# Patient Record
Sex: MALE | Race: WHITE | URBAN - METROPOLITAN AREA
[De-identification: names, ages, dates, MRNs, and addresses within clinical notes are randomized per-mention and may not be internally consistent; named-entity substitution may affect disease eponyms.]

---

## 2020-01-15 ENCOUNTER — OFFICE VISIT (OUTPATIENT)
Dept: NEUROLOGY | Facility: CLINIC | Age: 70
End: 2020-01-15
Payer: MEDICARE

## 2020-01-15 ENCOUNTER — MED REC SCAN ONLY (OUTPATIENT)
Dept: NEUROLOGY | Facility: CLINIC | Age: 70
End: 2020-01-15

## 2020-01-15 VITALS
HEART RATE: 80 BPM | BODY MASS INDEX: 29.12 KG/M2 | RESPIRATION RATE: 16 BRPM | WEIGHT: 215 LBS | SYSTOLIC BLOOD PRESSURE: 160 MMHG | HEIGHT: 72 IN | DIASTOLIC BLOOD PRESSURE: 102 MMHG

## 2020-01-15 DIAGNOSIS — G57.92 NEURITIS OF LEFT FOOT: Primary | ICD-10-CM

## 2020-01-15 PROCEDURE — 99204 OFFICE O/P NEW MOD 45 MIN: CPT | Performed by: PHYSICAL MEDICINE & REHABILITATION

## 2020-01-15 RX ORDER — HYDROCODONE BITARTRATE AND ACETAMINOPHEN 5; 325 MG/1; MG/1
1 TABLET ORAL EVERY 8 HOURS PRN
Qty: 90 TABLET | Refills: 0 | Status: SHIPPED | OUTPATIENT
Start: 2020-01-15 | End: 2020-02-20

## 2020-01-15 RX ORDER — HYDROCODONE BITARTRATE AND ACETAMINOPHEN 5; 325 MG/1; MG/1
1 TABLET ORAL EVERY 6 HOURS PRN
COMMUNITY
End: 2020-01-15

## 2020-01-15 RX ORDER — NORTRIPTYLINE HYDROCHLORIDE 10 MG/1
CAPSULE ORAL
Qty: 60 CAPSULE | Refills: 0 | Status: SHIPPED | OUTPATIENT
Start: 2020-01-15 | End: 2020-02-20

## 2020-01-15 RX ORDER — LISINOPRIL 30 MG/1
30 TABLET ORAL DAILY
COMMUNITY

## 2020-01-15 NOTE — PROGRESS NOTES
[address]  Progress Note    CHIEF COMPLAINT:  Patient presents with: Foot Pain: Patient presents for left foot pain for past 8-9 years, has been constant for past 4 years.  Wearing boots worsen pain, rated family history on file. CURRENT MEDICATIONS:   Current Outpatient Medications   Medication Sig Dispense Refill   • lisinopril 30 MG Oral Tab Take 30 mg by mouth daily.      • Nortriptyline HCl 10 MG Oral Cap 1-2 tablets orally every night 60 capsule 0 peripheral pulses intact. Normal capillary refill. Lungs: Non-labored respirations  Extremities: No lower extremity edema bilaterally   Skin: No lesions noted.    Spine:  full and painfree lumbar ROM in all directions  Feet: He is slightly puffy just infe

## 2020-02-20 ENCOUNTER — OFFICE VISIT (OUTPATIENT)
Dept: NEUROLOGY | Facility: CLINIC | Age: 70
End: 2020-02-20
Payer: MEDICARE

## 2020-02-20 VITALS
HEIGHT: 72 IN | BODY MASS INDEX: 29.12 KG/M2 | DIASTOLIC BLOOD PRESSURE: 90 MMHG | HEART RATE: 80 BPM | WEIGHT: 215 LBS | SYSTOLIC BLOOD PRESSURE: 136 MMHG

## 2020-02-20 DIAGNOSIS — G57.92 NEURITIS OF LEFT FOOT: Primary | ICD-10-CM

## 2020-02-20 PROCEDURE — 99213 OFFICE O/P EST LOW 20 MIN: CPT | Performed by: PHYSICAL MEDICINE & REHABILITATION

## 2020-02-20 RX ORDER — HYDROCODONE BITARTRATE AND ACETAMINOPHEN 5; 325 MG/1; MG/1
1 TABLET ORAL EVERY 8 HOURS PRN
Qty: 90 TABLET | Refills: 0 | Status: SHIPPED | OUTPATIENT
Start: 2020-02-20 | End: 2020-03-23

## 2020-02-20 RX ORDER — NORTRIPTYLINE HYDROCHLORIDE 25 MG/1
CAPSULE ORAL
Qty: 60 CAPSULE | Refills: 0 | Status: SHIPPED | OUTPATIENT
Start: 2020-02-20 | End: 2020-03-25

## 2020-02-20 NOTE — PROGRESS NOTES
[address]  Progress Note    CHIEF COMPLAINT:  Patient presents with: Foot Pain: LOV: 1/15/20. F/U on left heel pain.  Patient states that the pain has remained the same; cold makes it worst. He is taking no User    Substance and Sexual Activity      Alcohol use: Yes        Frequency: 2-3 times a week      Drug use: Never      Sexual activity: Not on file      FAMILY HISTORY:   History reviewed. No pertinent family history.     CURRENT MEDICATIONS:   Current Ou neg        Data    Radiology Imaging:  I previously reviewed a foot MRI from March 2017 which was unremarkable. The medial ankle tendons appear to be intact. No swelling. There is a high T2 signal bone island in the medial malleolus.        ASSESSMENT AN

## 2020-02-21 ENCOUNTER — MED REC SCAN ONLY (OUTPATIENT)
Dept: NEUROLOGY | Facility: CLINIC | Age: 70
End: 2020-02-21

## 2020-03-23 ENCOUNTER — TELEPHONE (OUTPATIENT)
Dept: NEUROLOGY | Facility: CLINIC | Age: 70
End: 2020-03-23

## 2020-03-23 RX ORDER — HYDROCODONE BITARTRATE AND ACETAMINOPHEN 5; 325 MG/1; MG/1
1 TABLET ORAL EVERY 8 HOURS PRN
Qty: 90 TABLET | Refills: 0 | Status: SHIPPED | OUTPATIENT
Start: 2020-03-23 | End: 2020-04-20

## 2020-03-23 NOTE — TELEPHONE ENCOUNTER
Spoke with patient, had upcoming appt 3/25/20, cancelled due to coronavirus, patient was understanding and would like to do the telephone visit, informed it would be billed as a regular visit, patient was understanding and requesting a refill of Norco.

## 2020-03-25 ENCOUNTER — TELEPHONE (OUTPATIENT)
Dept: NEUROLOGY | Facility: CLINIC | Age: 70
End: 2020-03-25

## 2020-03-25 ENCOUNTER — OFFICE VISIT (OUTPATIENT)
Dept: NEUROLOGY | Facility: CLINIC | Age: 70
End: 2020-03-25
Payer: MEDICARE

## 2020-03-25 DIAGNOSIS — G57.92 NEURITIS OF LEFT FOOT: Primary | ICD-10-CM

## 2020-03-25 PROCEDURE — G2012 BRIEF CHECK IN BY MD/QHP: HCPCS | Performed by: PHYSICAL MEDICINE & REHABILITATION

## 2020-03-25 RX ORDER — NORTRIPTYLINE HYDROCHLORIDE 25 MG/1
25 CAPSULE ORAL NIGHTLY
Qty: 90 CAPSULE | Refills: 1 | Status: SHIPPED | OUTPATIENT
Start: 2020-03-25 | End: 2020-06-08

## 2020-03-25 RX ORDER — NORTRIPTYLINE HYDROCHLORIDE 25 MG/1
CAPSULE ORAL
Qty: 60 CAPSULE | Refills: 0 | OUTPATIENT
Start: 2020-03-25

## 2020-03-25 NOTE — TELEPHONE ENCOUNTER
Virtual/Telephone Check-In    Josh Montgomery verbally consents to a Virtual/Telephone Check-In service on 03/25/20. Patient understands and accepts financial responsibility for any deductible, co-insurance and/or co-pays associated with this service.     Du

## 2020-03-25 NOTE — PROGRESS NOTES
Today, I had a phone conversation with Felice Zimmer  in lieu of an office visit, given the coronavirus epidemic. Please see the telephone encounter for details.

## 2020-04-20 RX ORDER — HYDROCODONE BITARTRATE AND ACETAMINOPHEN 5; 325 MG/1; MG/1
1 TABLET ORAL EVERY 8 HOURS PRN
Qty: 90 TABLET | Refills: 0 | Status: SHIPPED | OUTPATIENT
Start: 2020-04-22 | End: 2020-05-22

## 2020-04-20 NOTE — TELEPHONE ENCOUNTER
Medication request: Norco 5-325mg 1 TAB Q8H PRN     LOV: 20  NOV: 20    [place] 20 #90 r-0    Patient asking if Rx can be given to start today since he he going to [place] tomorrow morning for . States he will be th

## 2020-04-20 NOTE — TELEPHONE ENCOUNTER
Patient is requesting to get Rx today.  He states he will be out tomorrow but is going out of town chelsy

## 2020-04-20 NOTE — TELEPHONE ENCOUNTER
Informed Sparkle Eric [facility]) that Bude Rx start date can be 04/20/20. S/w patient and let him know Dr. Ulises Bowen message. Patient states he is very thankful for allowing the early refill.

## 2020-05-22 RX ORDER — HYDROCODONE BITARTRATE AND ACETAMINOPHEN 5; 325 MG/1; MG/1
1 TABLET ORAL EVERY 8 HOURS PRN
Qty: 90 TABLET | Refills: 0 | Status: SHIPPED | OUTPATIENT
Start: 2020-05-22 | End: 2020-06-17

## 2020-05-22 NOTE — TELEPHONE ENCOUNTER
[facility] to [facility] Rx [facility]ill. Last refilled on 4/21/20. Drug hydrocodone 5-325 #90 pend to Dr Sharan Keenan    Last refill 4/21/20    Last office visit 3/25/20    Next appointment 6/9/20. [facility]

## 2020-05-26 ENCOUNTER — TELEPHONE (OUTPATIENT)
Dept: NEUROLOGY | Facility: CLINIC | Age: 70
End: 2020-05-26

## 2020-05-26 NOTE — TELEPHONE ENCOUNTER
left message with answering service requesting refill of hydrocodone 5-325 #90 -  LOV 2/20/2020 NOV 6/9/2020

## 2020-06-08 ENCOUNTER — TELEMEDICINE (OUTPATIENT)
Dept: NEUROLOGY | Facility: CLINIC | Age: 70
End: 2020-06-08
Payer: MEDICARE

## 2020-06-08 ENCOUNTER — TELEPHONE (OUTPATIENT)
Dept: NEUROLOGY | Facility: CLINIC | Age: 70
End: 2020-06-08

## 2020-06-08 DIAGNOSIS — G57.92 NEURITIS OF LEFT FOOT: Primary | ICD-10-CM

## 2020-06-08 PROCEDURE — 99214 OFFICE O/P EST MOD 30 MIN: CPT | Performed by: PHYSICAL MEDICINE & REHABILITATION

## 2020-06-08 NOTE — TELEPHONE ENCOUNTER
Medicare Online for insurance coverage of Left medial calcaneal nerve block with ultrasound guidance cpt codes 53868-EF, [id number], . Insurance was verified and procedure is a covered benefit. Authorization is not required.  Procedure may be scheduled afte

## 2020-06-08 NOTE — PROGRESS NOTES
[address] [facility]    Telemedicine Visit - Follow-up Evaluation    Deena Jones verbally consents to a Telemedicine Visit on 06/08/20.  This visit is conducted using Telemedicine with live, interactive audio and vid replacement         CURRENT MEDICATIONS:     Current Outpatient Medications   Medication Sig Dispense Refill   • HYDROcodone-acetaminophen 5-325 MG Oral Tab Take 1 tablet by mouth every 8 (eight) hours as needed for Pain.  90 tablet 0   • lisinopril 30 MG O Since nortriptyline is not helping, I have discontinued it.         Follow-up: For a medial calcaneal nerve injection    We discussed that a telemedicine visit is in place of an office visit; however, this limits the ability to perform a thorough physical e

## 2020-06-17 ENCOUNTER — OFFICE VISIT (OUTPATIENT)
Dept: NEUROLOGY | Facility: CLINIC | Age: 70
End: 2020-06-17
Payer: MEDICARE

## 2020-06-17 VITALS
HEART RATE: 80 BPM | HEIGHT: 72 IN | DIASTOLIC BLOOD PRESSURE: 82 MMHG | SYSTOLIC BLOOD PRESSURE: 140 MMHG | WEIGHT: 210 LBS | BODY MASS INDEX: 28.44 KG/M2

## 2020-06-17 DIAGNOSIS — G57.92 NEURITIS OF LEFT FOOT: Primary | ICD-10-CM

## 2020-06-17 PROCEDURE — 76942 ECHO GUIDE FOR BIOPSY: CPT | Performed by: PHYSICAL MEDICINE & REHABILITATION

## 2020-06-17 PROCEDURE — 64450 NJX AA&/STRD OTHER PN/BRANCH: CPT | Performed by: PHYSICAL MEDICINE & REHABILITATION

## 2020-06-17 RX ORDER — OMEPRAZOLE 20 MG/1
20 CAPSULE, DELAYED RELEASE ORAL EVERY MORNING
COMMUNITY

## 2020-06-17 RX ORDER — HYDROCODONE BITARTRATE AND ACETAMINOPHEN 5; 325 MG/1; MG/1
1 TABLET ORAL EVERY 8 HOURS PRN
Qty: 90 TABLET | Refills: 0 | Status: SHIPPED | OUTPATIENT
Start: 2020-06-17 | End: 2020-07-20

## 2020-06-17 RX ORDER — LIDOCAINE HYDROCHLORIDE 10 MG/ML
2 INJECTION, SOLUTION INFILTRATION; PERINEURAL ONCE
Status: COMPLETED | OUTPATIENT
Start: 2020-06-17 | End: 2020-06-17

## 2020-06-17 RX ORDER — TRIAMCINOLONE ACETONIDE 40 MG/ML
40 INJECTION, SUSPENSION INTRA-ARTICULAR; INTRAMUSCULAR ONCE
Status: COMPLETED | OUTPATIENT
Start: 2020-06-17 | End: 2020-06-17

## 2020-06-23 ENCOUNTER — MED REC SCAN ONLY (OUTPATIENT)
Dept: NEUROLOGY | Facility: CLINIC | Age: 70
End: 2020-06-23

## 2020-07-14 ENCOUNTER — TELEMEDICINE (OUTPATIENT)
Dept: NEUROLOGY | Facility: CLINIC | Age: 70
End: 2020-07-14
Payer: MEDICARE

## 2020-07-14 DIAGNOSIS — G57.92 NEURITIS OF LEFT FOOT: ICD-10-CM

## 2020-07-14 DIAGNOSIS — M25.572 PAIN IN LEFT ANKLE AND JOINTS OF LEFT FOOT: Primary | ICD-10-CM

## 2020-07-14 PROCEDURE — 99213 OFFICE O/P EST LOW 20 MIN: CPT | Performed by: PHYSICAL MEDICINE & REHABILITATION

## 2020-07-14 NOTE — PROGRESS NOTES
[address] [facility]    Telemedicine Visit - Follow-up Evaluation    April Isamaryogi verbally consents to a Telemedicine Visit on 07/14/20.  This visit is conducted using Telemedicine with live, interactive audio and vid on file.       PAST SURGICAL HISTORY:     Past Surgical History:   Procedure Laterality Date   • BACK SURGERY  2016    L4-5 disc replacement         CURRENT MEDICATIONS:     Current Outpatient Medications   Medication Sig Dispense Refill   • omeprazole 20 M ultrasound to attentionally identify a neuroma. If it is present, perhaps it may be responsive to surgical resection. Patient agrees with the plan and I will contact the [facility] radiology department versus Dr. Alycia Foley at [facility].       Follow-up: Pos

## 2020-07-20 ENCOUNTER — TELEPHONE (OUTPATIENT)
Dept: NEUROLOGY | Facility: CLINIC | Age: 70
End: 2020-07-20

## 2020-07-20 DIAGNOSIS — M79.672 PAIN IN LEFT FOOT: ICD-10-CM

## 2020-07-20 DIAGNOSIS — G57.92 NEURITIS OF LEFT FOOT: Primary | ICD-10-CM

## 2020-07-20 RX ORDER — HYDROCODONE BITARTRATE AND ACETAMINOPHEN 5; 325 MG/1; MG/1
1 TABLET ORAL EVERY 8 HOURS PRN
Qty: 90 TABLET | Refills: 0 | Status: SHIPPED | OUTPATIENT
Start: 2020-07-20 | End: 2020-08-19

## 2020-07-20 NOTE — TELEPHONE ENCOUNTER
Medication request: Norco 5-325mg q8h prn pain    LOV 7/14/20  No follow up    [place]17[place]20

## 2020-07-20 NOTE — TELEPHONE ENCOUNTER
I discussed his case with the radiologist Dr. Molly Fonseca. He can do the ultrasound. It should be done on Thursdays when the tech is here. Please see the order below. Please call the patient and help him schedule it. Thanks.

## 2020-07-21 NOTE — TELEPHONE ENCOUNTER
Spoke to patient and notified him of below. He was understanding. Provided phone number for scheduling to schedule u/s on a Thursday.

## 2020-08-19 ENCOUNTER — TELEPHONE (OUTPATIENT)
Dept: NEUROLOGY | Facility: CLINIC | Age: 70
End: 2020-08-19

## 2020-08-19 DIAGNOSIS — M79.672 PAIN IN LEFT FOOT: Primary | ICD-10-CM

## 2020-08-19 DIAGNOSIS — G57.92 NEURITIS OF LEFT FOOT: ICD-10-CM

## 2020-08-19 RX ORDER — HYDROCODONE BITARTRATE AND ACETAMINOPHEN 5; 325 MG/1; MG/1
1 TABLET ORAL EVERY 8 HOURS PRN
Qty: 90 TABLET | Refills: 0 | Status: SHIPPED | OUTPATIENT
Start: 2020-08-19 | End: 2020-09-18

## 2020-08-19 NOTE — TELEPHONE ENCOUNTER
Refill request for norco 5/325 mg, take 1 tab every 8 hrs as needed, #90, no refills    LOV: 7/14/20  NOV: 9/4/20  [place]

## 2020-08-20 NOTE — TELEPHONE ENCOUNTER
It would need to be scheduled at [facility] with Dr. Romero Mayo. See new script.

## 2020-08-20 NOTE — TELEPHONE ENCOUNTER
S/w pt who states he would like to get ultrasound done at [facility] Dr. Kary Hurst had mentioned. Routing to Dr. Kary Hurst to specify where this is done and will provide patient with scheduling information appropriately.      Informed pt he would then have to

## 2020-08-20 NOTE — TELEPHONE ENCOUNTER
P:   F: [phone]    Left detailed message for pt (JAKUB verified) and informed him of Dr. Li Gonzales contact information at [facility] (listed above). Confirmed order will be faxed accordingly.  Business hours/contact info left on  fo

## 2020-08-20 NOTE — TELEPHONE ENCOUNTER
Left detailed message with information below. Asked him to let us know if he has had ultrasound scheduled/done; if not to get it done and phone number provided.

## 2020-09-04 ENCOUNTER — TELEMEDICINE (OUTPATIENT)
Dept: NEUROLOGY | Facility: CLINIC | Age: 70
End: 2020-09-04
Payer: MEDICARE

## 2020-09-04 DIAGNOSIS — G57.92 NEURITIS OF LEFT FOOT: Primary | ICD-10-CM

## 2020-09-04 PROCEDURE — 99213 OFFICE O/P EST LOW 20 MIN: CPT | Performed by: PHYSICAL MEDICINE & REHABILITATION

## 2020-09-18 RX ORDER — HYDROCODONE BITARTRATE AND ACETAMINOPHEN 5; 325 MG/1; MG/1
1 TABLET ORAL EVERY 8 HOURS PRN
Qty: 90 TABLET | Refills: 0 | Status: SHIPPED | OUTPATIENT
Start: 2020-09-18 | End: 2020-10-16

## 2020-09-18 NOTE — TELEPHONE ENCOUNTER
Refill request for norco 5/325 mg, take 1 tab every 8 hrs as needed, #90, no refills    LOV: 9/4/20  NOV: 9/21/20  [place]

## 2020-09-21 ENCOUNTER — OFFICE VISIT (OUTPATIENT)
Dept: NEUROLOGY | Facility: CLINIC | Age: 70
End: 2020-09-21
Payer: MEDICARE

## 2020-09-21 VITALS — BODY MASS INDEX: 28.44 KG/M2 | HEIGHT: 72 IN | WEIGHT: 210 LBS

## 2020-09-21 DIAGNOSIS — G57.92 NEURITIS OF LEFT FOOT: Primary | ICD-10-CM

## 2020-09-21 PROBLEM — I10 ESSENTIAL HYPERTENSION: Status: ACTIVE | Noted: 2020-07-24

## 2020-09-21 PROBLEM — K21.9 GASTROESOPHAGEAL REFLUX DISEASE WITHOUT ESOPHAGITIS: Status: ACTIVE | Noted: 2020-08-03

## 2020-09-21 PROBLEM — E66.3 OVERWEIGHT: Status: ACTIVE | Noted: 2020-08-03

## 2020-09-21 PROCEDURE — 99213 OFFICE O/P EST LOW 20 MIN: CPT | Performed by: PHYSICAL MEDICINE & REHABILITATION

## 2020-09-21 NOTE — PROGRESS NOTES
[address]  Progress Note    CHIEF COMPLAINT:  Patient presents with: Foot Pain: Patient presents for follow up on left foot. LOV 06/17/2020.  He states that there has been no change in symptoms and had US d User    Substance and Sexual Activity      Alcohol use: Yes        Frequency: 2-3 times a week      Drug use: Never      Sexual activity: Not on file      FAMILY HISTORY:   No family history on file.     CURRENT MEDICATIONS:   Current Outpatient Medications nerve along with scar tissue along the pathway of the medial calcaneal nerve. No neuroma was identified. ASSESSMENT AND PLAN:  1. Neuritis of left foot  He has tried everything for this.   We have identified and verified that the medial calcaneal ner

## 2020-09-24 ENCOUNTER — MED REC SCAN ONLY (OUTPATIENT)
Dept: NEUROLOGY | Facility: CLINIC | Age: 70
End: 2020-09-24

## 2020-10-16 RX ORDER — HYDROCODONE BITARTRATE AND ACETAMINOPHEN 5; 325 MG/1; MG/1
1 TABLET ORAL EVERY 8 HOURS PRN
Qty: 90 TABLET | Refills: 0 | Status: SHIPPED | OUTPATIENT
Start: 2020-10-18 | End: 2020-11-18

## 2020-10-16 NOTE — TELEPHONE ENCOUNTER
Refill request for norco 5/325 mg, take 1 tab every 8 hrs as needed, #90, no refills    LOV: 9/21/20  NOV: none  [place]

## 2020-11-18 RX ORDER — HYDROCODONE BITARTRATE AND ACETAMINOPHEN 5; 325 MG/1; MG/1
1 TABLET ORAL EVERY 8 HOURS PRN
Qty: 90 TABLET | Refills: 0 | Status: SHIPPED | OUTPATIENT
Start: 2020-11-19 | End: 2021-09-22

## 2020-11-18 NOTE — TELEPHONE ENCOUNTER
Drug norco 5-325 #90 pend to Dr Mary Sandy. Last refill norco 10/20/20    Last office visit 9/21/20. Next appointment none. [place]. [facility] called, last filled 10/20/20.

## 2020-12-21 RX ORDER — HYDROCODONE BITARTRATE AND ACETAMINOPHEN 5; 325 MG/1; MG/1
1 TABLET ORAL EVERY 6 HOURS PRN
Qty: 90 TABLET | Refills: 0 | Status: SHIPPED | OUTPATIENT
Start: 2020-12-21 | End: 2021-01-19

## 2020-12-21 NOTE — TELEPHONE ENCOUNTER
Medication request: Hydrocodone- Acetaminophen 5-325 MG    Take 1 tablet by mouth every 8 (eight) hours as needed for Pain.       LOV: 9/21/2020  NOV: N/A    RADHA/Last refill:11/19/2020 # 52 E-5

## 2021-01-01 NOTE — PROCEDURES
Medial calcaneal nerve injection  After discussing the benefits and possible risks of this procedure including bleeding, infection and nerve injury among others and also discussing alternatives including oral medication or foregoing the injection, we proce
Statement Selected

## 2021-01-19 RX ORDER — HYDROCODONE BITARTRATE AND ACETAMINOPHEN 5; 325 MG/1; MG/1
1 TABLET ORAL EVERY 6 HOURS PRN
Qty: 90 TABLET | Refills: 0 | Status: SHIPPED | OUTPATIENT
Start: 2021-01-20 | End: 2021-02-18

## 2021-01-19 NOTE — TELEPHONE ENCOUNTER
Medication request: Norco 5-325mg Take 1 tablet by mouth every 6 (six) hours as needed for Pain.     LOV: 09/21/20  NOV: none    [place] 12[place]21/20 #90 r-0

## 2021-02-18 RX ORDER — HYDROCODONE BITARTRATE AND ACETAMINOPHEN 5; 325 MG/1; MG/1
1 TABLET ORAL EVERY 8 HOURS PRN
Qty: 90 TABLET | Refills: 0 | Status: SHIPPED | OUTPATIENT
Start: 2021-02-18 | End: 2021-03-19

## 2021-02-18 NOTE — TELEPHONE ENCOUNTER
Medication request: HYDROcodone-acetaminophen 5-325 MG Oral Tab  Sig:   Take 1 tablet by mouth every 6 (six) hours as needed for Pain.     LOV: 9/21/20  NOV: None    [place] 1/20/21 Qty#90 r-0

## 2021-03-08 DIAGNOSIS — Z23 NEED FOR VACCINATION: ICD-10-CM

## 2021-03-19 RX ORDER — HYDROCODONE BITARTRATE AND ACETAMINOPHEN 5; 325 MG/1; MG/1
1 TABLET ORAL EVERY 8 HOURS PRN
Qty: 90 TABLET | Refills: 0 | Status: SHIPPED | OUTPATIENT
Start: 2021-03-20 | End: 2021-04-21

## 2021-03-19 NOTE — TELEPHONE ENCOUNTER
Medication request: Norco 5-325mg Take 1 tablet by mouth every 8 (eight) hours as needed for Pain    LOV: 09/21/20   NOV: none    [place]: 02/18/21 #90 r-0    He takes Norco 3 times daily.   Apparently this does help and he has not required to inc

## 2021-04-21 ENCOUNTER — TELEPHONE (OUTPATIENT)
Dept: NEUROLOGY | Facility: CLINIC | Age: 71
End: 2021-04-21

## 2021-04-21 RX ORDER — HYDROCODONE BITARTRATE AND ACETAMINOPHEN 5; 325 MG/1; MG/1
1 TABLET ORAL EVERY 8 HOURS PRN
Qty: 90 TABLET | Refills: 0 | Status: SHIPPED | OUTPATIENT
Start: 2021-04-21 | End: 2021-05-21

## 2021-04-21 NOTE — TELEPHONE ENCOUNTER
Medication request: hydrocodone 5-325 mg Take 1 tablet by mouth every 8 hours as needed for pain Qty 90    LOV 09/21/2020  NOV- none    [place]: 03/20/21      Per LOV note patient needs f/u appointment.

## 2021-05-21 RX ORDER — HYDROCODONE BITARTRATE AND ACETAMINOPHEN 5; 325 MG/1; MG/1
1 TABLET ORAL EVERY 8 HOURS PRN
Qty: 90 TABLET | Refills: 0 | Status: SHIPPED | OUTPATIENT
Start: 2021-05-21 | End: 2021-06-21

## 2021-05-21 NOTE — TELEPHONE ENCOUNTER
Medication request:  HYDROcodone-acetaminophen 5-325 MG Oral Tab  Sig:   Take 1 tablet by mouth every 8 (eight) hours as needed for Pain.     LOV: 9/21/21  NOV 9/7/21    [place] 4/21/21 qty#90 r-0

## 2021-06-21 RX ORDER — HYDROCODONE BITARTRATE AND ACETAMINOPHEN 5; 325 MG/1; MG/1
1 TABLET ORAL EVERY 8 HOURS PRN
Qty: 90 TABLET | Refills: 0 | Status: SHIPPED | OUTPATIENT
Start: 2021-06-21 | End: 2021-07-21

## 2021-06-21 NOTE — TELEPHONE ENCOUNTER
Medication request:  HYDROcodone-acetaminophen 5-325 MG Oral Tab  Sig:   Take 1 tablet by mouth every 8 (eight) hours as needed for Pain.     LOV: 9/21/20  NOV: 9/7/21    [place] 5/22/21 qty#90 r-0

## 2021-07-21 RX ORDER — HYDROCODONE BITARTRATE AND ACETAMINOPHEN 5; 325 MG/1; MG/1
1 TABLET ORAL EVERY 8 HOURS PRN
Qty: 90 TABLET | Refills: 0 | Status: SHIPPED | OUTPATIENT
Start: 2021-07-21 | End: 2021-08-19

## 2021-07-21 NOTE — TELEPHONE ENCOUNTER
Medication request: HYDROcodone-acetaminophen 5-325 MG Oral Tab. Take 1 tablet by mouth every 8 (eight) hours as needed for Pain.     LOV 09/21/2020  NOV 09/07/2021    [place] 06/21/2021 #90 r-0

## 2021-08-19 ENCOUNTER — TELEPHONE (OUTPATIENT)
Dept: NEUROLOGY | Facility: CLINIC | Age: 71
End: 2021-08-19

## 2021-08-19 RX ORDER — HYDROCODONE BITARTRATE AND ACETAMINOPHEN 5; 325 MG/1; MG/1
1 TABLET ORAL EVERY 8 HOURS PRN
Qty: 90 TABLET | Refills: 0 | Status: SHIPPED | OUTPATIENT
Start: 2021-08-19 | End: 2021-09-18

## 2021-08-19 NOTE — TELEPHONE ENCOUNTER
Medication request: HYDROcodone-acetaminophen 5-325 MG Oral Tab  Take 1 tablet by mouth every 8 (eight) hours as needed for Pain.   #90-R-0    LOV 09/21/20  NOV 09/07/21    [place]07/21/21

## 2021-09-03 ENCOUNTER — TELEPHONE (OUTPATIENT)
Dept: PHYSICAL MEDICINE AND REHAB | Facility: CLINIC | Age: 71
End: 2021-09-03

## 2021-09-07 ENCOUNTER — OFFICE VISIT (OUTPATIENT)
Dept: PHYSICAL MEDICINE AND REHAB | Facility: CLINIC | Age: 71
End: 2021-09-07
Payer: MEDICARE

## 2021-09-07 VITALS
WEIGHT: 204 LBS | SYSTOLIC BLOOD PRESSURE: 140 MMHG | DIASTOLIC BLOOD PRESSURE: 82 MMHG | BODY MASS INDEX: 27.63 KG/M2 | HEIGHT: 72 IN

## 2021-09-07 DIAGNOSIS — G57.92 NEURITIS OF LEFT FOOT: Primary | ICD-10-CM

## 2021-09-07 DIAGNOSIS — K21.9 GASTROESOPHAGEAL REFLUX DISEASE WITHOUT ESOPHAGITIS: ICD-10-CM

## 2021-09-07 PROCEDURE — 99214 OFFICE O/P EST MOD 30 MIN: CPT | Performed by: PHYSICAL MEDICINE & REHABILITATION

## 2021-09-07 NOTE — PROGRESS NOTES
[address]  Progress Note    CHIEF COMPLAINT:  Patient presents with: Foot Pain: LOV 9/18/20 Pt comes in with left foot pain. States getting worse. Damp and cold make it worse.  Denies imaging, injections, P Tobacco Use      Smoking status: Current Every Day Smoker        Types: Cigarettes      Smokeless tobacco: Former User    Vaping Use      Vaping Use: Never used    Substance and Sexual Activity      Alcohol use: Yes      Drug use: Never      Sexual activit I previously reviewed a foot MRI from March 2017 which was unremarkable.  The medial ankle tendons appear to be intact.  No swelling. Bet[place] is a high T2 signal bone island in the medial malleolus.   2.  A diagnostic ultrasound of the medial ankle done at [facility]

## 2021-09-21 NOTE — TELEPHONE ENCOUNTER
Medication request: HYDROcodone-acetaminophen 5-325 MG Oral Tab  Sig:   Take 1 tablet by mouth every 8 (eight) hours as needed for Pain.     LOV: 9/7/21  NOV:  None    [place] 8/19/21 qty#90 r-0

## 2021-09-22 RX ORDER — HYDROCODONE BITARTRATE AND ACETAMINOPHEN 5; 325 MG/1; MG/1
1 TABLET ORAL EVERY 8 HOURS PRN
Qty: 90 TABLET | Refills: 0 | Status: SHIPPED | OUTPATIENT
Start: 2021-09-22

## 2021-10-26 RX ORDER — HYDROCODONE BITARTRATE AND ACETAMINOPHEN 5; 325 MG/1; MG/1
1 TABLET ORAL EVERY 8 HOURS PRN
Qty: 90 TABLET | Refills: 0 | Status: CANCELLED | OUTPATIENT
Start: 2021-10-26

## 2021-10-26 RX ORDER — HYDROCODONE BITARTRATE AND ACETAMINOPHEN 5; 325 MG/1; MG/1
1 TABLET ORAL EVERY 8 HOURS PRN
Qty: 90 TABLET | Refills: 0 | Status: SHIPPED | OUTPATIENT
Start: 2021-10-26 | End: 2021-11-25

## 2021-10-26 RX ORDER — HYDROCODONE BITARTRATE AND ACETAMINOPHEN 5; 325 MG/1; MG/1
1 TABLET ORAL EVERY 8 HOURS PRN
Qty: 90 TABLET | Refills: 0 | Status: SHIPPED | OUTPATIENT
Start: 2021-11-26 | End: 2021-12-26

## 2021-10-26 RX ORDER — HYDROCODONE BITARTRATE AND ACETAMINOPHEN 5; 325 MG/1; MG/1
1 TABLET ORAL EVERY 8 HOURS PRN
Qty: 90 TABLET | Refills: 0 | Status: SHIPPED | OUTPATIENT
Start: 2021-12-27 | End: 2022-01-26

## 2021-10-26 NOTE — TELEPHONE ENCOUNTER
Drug hydrocodone 5 mg #90 R0 take 1 every 8 hours prn pend to Dr Mary Parnell.   Last refill 9/22/21    Last office visit 9/22/21    Next appointment none    [place]

## 2021-11-29 NOTE — TELEPHONE ENCOUNTER
Pt called again- Needs refill- HYDROcodone-acetaminophen 7.5-325 MG Oral Tab - Said [facility] never called- please call to advise.

## 2021-11-29 NOTE — TELEPHONE ENCOUNTER
Call [facility], he had 3 months of scripts waiting for him, last script starting on 11/26. They should have it. See medication list...

## 2021-11-30 NOTE — TELEPHONE ENCOUNTER
Spoke to [email or web address]. Rx is available for  hydrocodone due 11/26/21. Left message on patient`s voice mail [employer] will fill Rx now. Advised to call for any other questions.

## 2021-12-27 ENCOUNTER — TELEPHONE (OUTPATIENT)
Dept: PHYSICAL MEDICINE AND REHAB | Facility: CLINIC | Age: 71
End: 2021-12-27

## 2022-01-27 RX ORDER — HYDROCODONE BITARTRATE AND ACETAMINOPHEN 5; 325 MG/1; MG/1
1 TABLET ORAL EVERY 8 HOURS PRN
Qty: 30 TABLET | Refills: 0 | Status: SHIPPED | OUTPATIENT
Start: 2022-03-30 | End: 2022-04-29

## 2022-01-27 RX ORDER — HYDROCODONE BITARTRATE AND ACETAMINOPHEN 5; 325 MG/1; MG/1
1 TABLET ORAL EVERY 8 HOURS PRN
Qty: 30 TABLET | Refills: 0 | Status: SHIPPED | OUTPATIENT
Start: 2022-01-27 | End: 2022-02-26

## 2022-01-27 RX ORDER — HYDROCODONE BITARTRATE AND ACETAMINOPHEN 5; 325 MG/1; MG/1
1 TABLET ORAL EVERY 8 HOURS PRN
Qty: 30 TABLET | Refills: 0 | Status: SHIPPED | OUTPATIENT
Start: 2022-02-27 | End: 2022-03-29

## 2022-01-27 RX ORDER — HYDROCODONE BITARTRATE AND ACETAMINOPHEN 5; 325 MG/1; MG/1
1 TABLET ORAL EVERY 8 HOURS PRN
Qty: 90 TABLET | Refills: 0 | Status: CANCELLED | OUTPATIENT
Start: 2022-01-30

## 2022-01-27 NOTE — TELEPHONE ENCOUNTER
Drug hydrocodone 5-325 #90 pend to Dr Marie Martínez. Last refill 12/31/21    Last office visit 9/27/21 plan continue norco 3 times per day, 1 year follow up. Next appointment none    [place]

## 2022-03-04 RX ORDER — HYDROCODONE BITARTRATE AND ACETAMINOPHEN 5; 325 MG/1; MG/1
1 TABLET ORAL EVERY 8 HOURS PRN
Qty: 30 TABLET | Refills: 0 | Status: CANCELLED | OUTPATIENT
Start: 2022-03-04 | End: 2022-04-03

## 2022-03-04 NOTE — TELEPHONE ENCOUNTER
RU CAZARES 1/27/22 \"3 months of Norco 5 refills sent\"    Called pharmacy to confirm and was told he doesn't have anything on file. Medication request:  HYDROcodone-acetaminophen ([address]) 5-325 MG Oral Tab  Sig - Route: Take 1 tablet by mouth every 8 (eight) hours as needed for Pain.     LOV 9/7/22  NOV NONE    [place] 02/12/22

## 2022-03-07 RX ORDER — HYDROCODONE BITARTRATE AND ACETAMINOPHEN 5; 325 MG/1; MG/1
1 TABLET ORAL EVERY 8 HOURS PRN
Qty: 90 TABLET | Refills: 0 | Status: SHIPPED | OUTPATIENT
Start: 2022-03-07 | End: 2022-04-06

## 2022-03-07 RX ORDER — HYDROCODONE BITARTRATE AND ACETAMINOPHEN 5; 325 MG/1; MG/1
1 TABLET ORAL EVERY 8 HOURS PRN
Qty: 90 TABLET | Refills: 0 | Status: SHIPPED | OUTPATIENT
Start: 2022-04-07 | End: 2022-05-07

## 2022-03-07 RX ORDER — HYDROCODONE BITARTRATE AND ACETAMINOPHEN 5; 325 MG/1; MG/1
1 TABLET ORAL EVERY 8 HOURS PRN
Qty: 90 TABLET | Refills: 0 | Status: SHIPPED | OUTPATIENT
Start: 2022-05-08 | End: 2022-06-07

## 2022-04-01 RX ORDER — HYDROCODONE BITARTRATE AND ACETAMINOPHEN 5; 325 MG/1; MG/1
1 TABLET ORAL EVERY 8 HOURS PRN
Qty: 90 TABLET | Refills: 0 | Status: CANCELLED | OUTPATIENT
Start: 2022-04-01 | End: 2022-05-01

## 2022-04-01 NOTE — TELEPHONE ENCOUNTER
Sent pt Tello message about refill denial question. Explained that his 3 month prescription of Elonda Heckler is still valid and will only be dispensed every 30 days, no sooner.

## 2022-04-04 RX ORDER — HYDROCODONE BITARTRATE AND ACETAMINOPHEN 5; 325 MG/1; MG/1
1 TABLET ORAL EVERY 8 HOURS PRN
Qty: 90 TABLET | Refills: 0 | Status: CANCELLED | OUTPATIENT
Start: 2022-04-06 | End: 2022-05-06

## 2022-04-04 NOTE — TELEPHONE ENCOUNTER
Drug norco 5-325 #90 pend to Dr Boo Chapa    Last refill 3/7/22    Last office visit 9/7/22. Plan 1 year follow up. Continue norco 3 per day. Next appointment none[place]

## 2022-04-14 ENCOUNTER — PATIENT MESSAGE (OUTPATIENT)
Dept: PHYSICAL MEDICINE AND REHAB | Facility: CLINIC | Age: 72
End: 2022-04-14

## 2022-04-18 NOTE — TELEPHONE ENCOUNTER
From: Nabil Maciel  To: Lizet Morrow MD  Sent: 4/14/2022 6:53 PM CDT  Subject: I moved to [address] hope you may be able to refer me to a physician in this area that is at least close to you in ability.  thank you

## 2022-04-19 ENCOUNTER — PATIENT MESSAGE (OUTPATIENT)
Dept: PHYSICAL MEDICINE AND REHAB | Facility: CLINIC | Age: 72
End: 2022-04-19

## 2022-04-19 NOTE — TELEPHONE ENCOUNTER
Dr. Waylon Wild recommends Dr. Heidy Canseco in [place], [place]. Sent pt message via [employer] with update/new MD information.

## 2022-04-21 NOTE — TELEPHONE ENCOUNTER
From: Mary Fuentes  Sent: 4/21/2022 11:57 AM CDT  To: Richar Emma  Subject: I moved to [facility], [facility] [address] am going to need my medical records sent to [facility], fax number [phone]. Please add this to the records, MRN #[id number], if you can do that. If not, let me know how I can get it done. Meanwhile, Dr. Akash Ferro retired last week, so they will assign me a physician once they inspect my medical records. Thank you.

## 2022-05-18 ENCOUNTER — TELEPHONE (OUTPATIENT)
Dept: NEUROLOGY | Facility: CLINIC | Age: 72
End: 2022-05-18
